# Patient Record
Sex: FEMALE | Race: AMERICAN INDIAN OR ALASKA NATIVE | NOT HISPANIC OR LATINO | Employment: OTHER | URBAN - METROPOLITAN AREA
[De-identification: names, ages, dates, MRNs, and addresses within clinical notes are randomized per-mention and may not be internally consistent; named-entity substitution may affect disease eponyms.]

---

## 2021-06-09 ENCOUNTER — APPOINTMENT (OUTPATIENT)
Dept: RADIOLOGY | Facility: MEDICAL CENTER | Age: 81
End: 2021-06-09
Attending: EMERGENCY MEDICINE
Payer: MEDICARE

## 2021-06-09 ENCOUNTER — HOSPITAL ENCOUNTER (EMERGENCY)
Facility: MEDICAL CENTER | Age: 81
End: 2021-06-09
Attending: EMERGENCY MEDICINE
Payer: MEDICARE

## 2021-06-09 VITALS
BODY MASS INDEX: 21.88 KG/M2 | OXYGEN SATURATION: 93 % | WEIGHT: 123.46 LBS | DIASTOLIC BLOOD PRESSURE: 89 MMHG | HEART RATE: 86 BPM | HEIGHT: 63 IN | RESPIRATION RATE: 45 BRPM | SYSTOLIC BLOOD PRESSURE: 138 MMHG | TEMPERATURE: 97.1 F

## 2021-06-09 DIAGNOSIS — I48.91 ATRIAL FIBRILLATION, UNSPECIFIED TYPE (HCC): ICD-10-CM

## 2021-06-09 LAB
ALBUMIN SERPL BCP-MCNC: 3.3 G/DL (ref 3.2–4.9)
ALBUMIN/GLOB SERPL: 1.1 G/DL
ALP SERPL-CCNC: 116 U/L (ref 30–99)
ALT SERPL-CCNC: 24 U/L (ref 2–50)
ANION GAP SERPL CALC-SCNC: 11 MMOL/L (ref 7–16)
AST SERPL-CCNC: 44 U/L (ref 12–45)
BASOPHILS # BLD AUTO: 0.7 % (ref 0–1.8)
BASOPHILS # BLD: 0.06 K/UL (ref 0–0.12)
BILIRUB SERPL-MCNC: 1.8 MG/DL (ref 0.1–1.5)
BUN SERPL-MCNC: 28 MG/DL (ref 8–22)
CALCIUM SERPL-MCNC: 8.6 MG/DL (ref 8.5–10.5)
CHLORIDE SERPL-SCNC: 111 MMOL/L (ref 96–112)
CO2 SERPL-SCNC: 21 MMOL/L (ref 20–33)
CREAT SERPL-MCNC: 1.02 MG/DL (ref 0.5–1.4)
DIGOXIN SERPL-MCNC: 0.3 NG/ML (ref 0.8–2)
EKG IMPRESSION: NORMAL
EOSINOPHIL # BLD AUTO: 0.19 K/UL (ref 0–0.51)
EOSINOPHIL NFR BLD: 2.1 % (ref 0–6.9)
ERYTHROCYTE [DISTWIDTH] IN BLOOD BY AUTOMATED COUNT: 53.6 FL (ref 35.9–50)
GLOBULIN SER CALC-MCNC: 3 G/DL (ref 1.9–3.5)
GLUCOSE SERPL-MCNC: 99 MG/DL (ref 65–99)
HCT VFR BLD AUTO: 53.3 % (ref 37–47)
HGB BLD-MCNC: 16.3 G/DL (ref 12–16)
IMM GRANULOCYTES # BLD AUTO: 0.05 K/UL (ref 0–0.11)
IMM GRANULOCYTES NFR BLD AUTO: 0.6 % (ref 0–0.9)
LYMPHOCYTES # BLD AUTO: 1.57 K/UL (ref 1–4.8)
LYMPHOCYTES NFR BLD: 17.4 % (ref 22–41)
MCH RBC QN AUTO: 25.6 PG (ref 27–33)
MCHC RBC AUTO-ENTMCNC: 30.6 G/DL (ref 33.6–35)
MCV RBC AUTO: 83.7 FL (ref 81.4–97.8)
MONOCYTES # BLD AUTO: 0.77 K/UL (ref 0–0.85)
MONOCYTES NFR BLD AUTO: 8.5 % (ref 0–13.4)
NEUTROPHILS # BLD AUTO: 6.39 K/UL (ref 2–7.15)
NEUTROPHILS NFR BLD: 70.7 % (ref 44–72)
NRBC # BLD AUTO: 0 K/UL
NRBC BLD-RTO: 0 /100 WBC
PLATELET # BLD AUTO: 410 K/UL (ref 164–446)
PMV BLD AUTO: 11.1 FL (ref 9–12.9)
POTASSIUM SERPL-SCNC: 4.6 MMOL/L (ref 3.6–5.5)
PROT SERPL-MCNC: 6.3 G/DL (ref 6–8.2)
RBC # BLD AUTO: 6.37 M/UL (ref 4.2–5.4)
SODIUM SERPL-SCNC: 143 MMOL/L (ref 135–145)
T4 FREE SERPL-MCNC: 1.99 NG/DL (ref 0.93–1.7)
TROPONIN T SERPL-MCNC: 20 NG/L (ref 6–19)
TSH SERPL DL<=0.005 MIU/L-ACNC: 2.36 UIU/ML (ref 0.38–5.33)
WBC # BLD AUTO: 9 K/UL (ref 4.8–10.8)

## 2021-06-09 PROCEDURE — 84443 ASSAY THYROID STIM HORMONE: CPT

## 2021-06-09 PROCEDURE — 99283 EMERGENCY DEPT VISIT LOW MDM: CPT

## 2021-06-09 PROCEDURE — 84439 ASSAY OF FREE THYROXINE: CPT

## 2021-06-09 PROCEDURE — 71045 X-RAY EXAM CHEST 1 VIEW: CPT

## 2021-06-09 PROCEDURE — 85025 COMPLETE CBC W/AUTO DIFF WBC: CPT

## 2021-06-09 PROCEDURE — 84484 ASSAY OF TROPONIN QUANT: CPT

## 2021-06-09 PROCEDURE — 93005 ELECTROCARDIOGRAM TRACING: CPT

## 2021-06-09 PROCEDURE — 80053 COMPREHEN METABOLIC PANEL: CPT

## 2021-06-09 PROCEDURE — 80162 ASSAY OF DIGOXIN TOTAL: CPT

## 2021-06-09 PROCEDURE — 93005 ELECTROCARDIOGRAM TRACING: CPT | Performed by: EMERGENCY MEDICINE

## 2021-06-09 NOTE — ED NOTES
Iv was established Pt is resting in bed speaking in full sentences. Pt shows no s/s of distress. VS are stable. Blood was sent. Pt is waiting on Xray. Will continue to monitor.

## 2021-06-09 NOTE — ED PROVIDER NOTES
ED Provider Note    ED Provider Note    Primary care provider: No primary care provider on file.  Means of arrival: Walk-in  History obtained from: Patient    CHIEF COMPLAINT  Chief Complaint   Patient presents with   • Shortness of Breath     since yesterday afternoon. denies CP and palpitations, pt reports mild central chest tightness.      Seen at 2:45 AM.   HPI  Danielle Childers is a 80 y.o. female visiting from [place] who presents to the Emergency Department palpitations for the past 48 hours.  The patient notes a mild dull ache in the substernal region that has been intermittent as well.  She denies any nausea, vomiting, headache, shortness of breath, cough, fevers, chills, abdominal pain, lightheadedness, numbness or focal weakness.    She is unaware of any history of atrial fibrillation, however she is on Eliquis as well as digoxin and Lasix.  She does not know why she is on Eliquis but she denies any history of mechanical heart valves, no prior history of DVT or pulmonary embolus.  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  She does have a history of COPD but does not feel that she is currently having a flare.    REVIEW OF SYSTEMS  See HPI,   Remainder of ROS negative.     PAST MEDICAL HISTORY   has a past medical history of Chronic obstructive pulmonary disease (HCC) and CABG.    SURGICAL HISTORY  patient denies any surgical history    SOCIAL HISTORY  Social History     Tobacco Use   • Smoking status: Current Every Day Smoker     Packs/day: 0.25     Types: Cigarettes   • Smokeless tobacco: Never Used   Substance Use Topics   • Alcohol use: Never   • Drug use: Yes     Types: Inhaled     Comment: marijuana       Social History     Substance and Sexual Activity   Drug Use Yes   • Types: Inhaled    Comment: marijuana        FAMILY HISTORY  History reviewed. No pertinent family history.    CURRENT MEDICATIONS  Reviewed.  See Encounter Summary.     ALLERGIES  No Known  "Allergies    PHYSICAL EXAM  VITAL SIGNS: /89   Pulse 86   Temp 36.2 °C (97.1 °F) (Temporal)   Resp (!) 45   Ht 1.6 m (5' 3\")   Wt 56 kg (123 lb 7.3 oz)   SpO2 93%   BMI 21.87 kg/m²   Constitutional: Awake, alert in no apparent distress.  HENT: Normocephalic, Bilateral external ears normal. Nose normal.   Eyes: Conjunctiva normal, non-icteric, EOMI.    Thorax & Lungs: Easy unlabored respirations, Clear to ascultation bilaterally.  Diminished breath sounds, consistent with COPD  Cardiovascular: Irregularly irregular, No murmurs, rubs or gallops. Bilateral pulses symmetrical.   Abdomen:  Soft, nontender, nondistended, normal active bowel sounds.   :    Skin: Visualized skin is  Dry, No erythema, No rash.   Musculoskeletal:   No cyanosis, clubbing or edema. No leg asymmetry.   Neurologic: Alert, Grossly non-focal.   Psychiatric: Normal affect, Normal mood  Lymphatic:  No cervical LAD    EKG   12 lead Interpreted by me  Rhythm: Atrial fibrillation, frequent PVCs  Rate: 100  Axis: normal  Ectopy: none  Conduction: normal  ST Segments: no acute change  T Waves: Lateral T wave inversions  Clinical Impression: Atrial fibrillation, lateral T wave inversions, frequent PVCs, no prior EKGs for direct comparison.    RADIOLOGY  DX-CHEST-PORTABLE (1 VIEW)   Final Result      Cardiomegaly and findings in keeping with CHF.            COURSE & MEDICAL DECISION MAKING  Pertinent Labs & Imaging studies reviewed. (See chart for details)    Differential diagnoses include but are not limited to: Likely paroxysmal atrial fibrillation, less likely ACS, COPD    2:45 AM - Medical record reviewed, last visit in our system was in 2007 for chest pain.    4:32 AM: Discussed the test results and treatment options.  The patient has not had any severe chest pain, she has not had exertional chest pain or shortness of breath.  I feel that she is quite stable and can be discharged with follow-up with her cardiologist.  She is in " agreement.  Decision Making:  This is a [facility] 80 y.o. year old female who presents with essentially palpitations for the past 48 hours.  The patient is unaware of any history of atrial fibrillation but by her medication list, it seems likely that she is either in paroxysmal atrial fibrillation or chronic A. fib.  She does not have any other indication to be on Eliquis or digoxin.     Laboratory work-up today is unremarkable.  Thyroid studies did not show severe hyperthyroidism.  Troponin T is elevated at 20 which is just above the cutoff of 19.  I do not suspect ACS, she does not have any acute EKG changes.  She does not have any chest pain, she has more of a palpitations.  Her digoxin level today is subtherapeutic at 0.3.  I believe that her symptoms are likely due to a subtherapeutic digoxin level.  I do not know what her normal doses and and hesitant to load her on this medication without knowing any further history for her.    Currently her rate is well controlled, she is between 80 and 100.  Oxygenation is excellent.  She has no signs of decompensated heart failure.  I feel that she is stable for discharge.  I do recommend she follow-up with her cardiologist to discuss a subtherapeutic digoxin level and see if they wish to p.o. load her or escalate her daily dosage.    I explained that palpitations can be a normal sign of atrial fibrillation but if she develops any chest pain, increased work of breathing, dyspnea on exertion, orthopnea, this would not be normal and she needs to return for repeat evaluation.    Discharge Medications:  New Prescriptions    No medications on file       The patient was discharged home (see d/c instructions) was told to return immediately for any signs or symptoms listed, or any worsening at all.  The patient verbally agreed to the discharge precautions and follow-up plan which is documented in EPIC.        FINAL IMPRESSION  1. Atrial fibrillation, unspecified type (HCC)

## 2021-06-09 NOTE — ED NOTES
Pt was DC with all follow up care. All questions were answered. Pt will stay in room until her daughter comes to get her.

## 2021-06-09 NOTE — ED TRIAGE NOTES
"Chief Complaint   Patient presents with   • Shortness of Breath     since yesterday afternoon. denies CP and palpitations, pt reports mild central chest tightness.        Pt to triage for above complaint. Protocol EKG completed and showing afib, pt denies any hx of afib. Hx of CAGB X 1.    Pt is alert and oriented, speaking in full sentences, follows commands and responds appropriately to questions. Resp are even and unlabored.     Pt placed in lobby. Pt educated on triage process and encouraged to alert staff for any changes.      /86   Pulse (!) 115   Temp 36.2 °C (97.1 °F) (Temporal)   Resp 16   Ht 1.6 m (5' 3\")   Wt 56 kg (123 lb 7.3 oz)   SpO2 94%     "